# Patient Record
Sex: FEMALE | Race: WHITE
[De-identification: names, ages, dates, MRNs, and addresses within clinical notes are randomized per-mention and may not be internally consistent; named-entity substitution may affect disease eponyms.]

---

## 2018-07-30 ENCOUNTER — HOSPITAL ENCOUNTER (EMERGENCY)
Dept: HOSPITAL 68 - ERH | Age: 28
End: 2018-07-30
Payer: COMMERCIAL

## 2018-07-30 VITALS — BODY MASS INDEX: 25.68 KG/M2 | WEIGHT: 136 LBS | HEIGHT: 61 IN

## 2018-07-30 VITALS — DIASTOLIC BLOOD PRESSURE: 72 MMHG | SYSTOLIC BLOOD PRESSURE: 126 MMHG

## 2018-07-30 DIAGNOSIS — S61.032A: Primary | ICD-10-CM
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
## 2018-07-30 LAB
ABSOLUTE GRANULOCYTE CT: 5.2 /CUMM (ref 1.4–6.5)
BASOPHILS # BLD: 0 /CUMM (ref 0–0.2)
BASOPHILS NFR BLD: 0.1 % (ref 0–2)
EOSINOPHIL # BLD: 0 /CUMM (ref 0–0.7)
EOSINOPHIL NFR BLD: 0.7 % (ref 0–5)
ERYTHROCYTE [DISTWIDTH] IN BLOOD BY AUTOMATED COUNT: 16.1 % (ref 11.5–14.5)
GRANULOCYTES NFR BLD: 73.3 % (ref 42.2–75.2)
HCT VFR BLD CALC: 28.1 % (ref 37–47)
LYMPHOCYTES # BLD: 1.5 /CUMM (ref 1.2–3.4)
MCH RBC QN AUTO: 21 PG (ref 27–31)
MCHC RBC AUTO-ENTMCNC: 32.2 G/DL (ref 33–37)
MCV RBC AUTO: 65.2 FL (ref 81–99)
MONOCYTES # BLD: 0.4 /CUMM (ref 0.1–0.6)
PLATELET # BLD: 257 /CUMM (ref 130–400)
PMV BLD AUTO: 9.1 FL (ref 7.4–10.4)
RED BLOOD CELL CT: 4.31 /CUMM (ref 4.2–5.4)
WBC # BLD AUTO: 7.2 /CUMM (ref 4.8–10.8)

## 2018-07-30 NOTE — ED GENERAL ADULT
History of Present Illness
 
General
Chief Complaint: General Adult
Stated Complaint: NEEDLESTICK
Source: patient
Exam Limitations: no limitations
 
Vital Signs & Intake/Output
Vital Signs & Intake/Output
 Vital Signs
 
 
Date Time Temp Pulse Resp B/P B/P Pulse O2 O2 Flow FiO2
 
     Mean Ox Delivery Rate 
 
 1310 98.8 80 18 126/72  100 Room Air  
 
 
 
Allergies
Coded Allergies:
NO KNOWN ALLERGIES (12)
 
Triage Note:
27 Y/O FEMALE PRESENTS S/P ACCIDENTAL NEEDLESTICK.
 PT STATES SHE WAS INJECTING IM MEDICATION TO PT,
 "SHE MOVED AND I STUCK MYSELF",  L THUMB AFFECTED.
 PT WASHED FINGER IMMEDIATELY.  CHARGE RN INFORMED.
 HOUSE SUPERVISOR BEING PAGED.  SOURCE PT REMAINS
 IN ED
Triage Nurses Notes Reviewed? yes
Onset: Abrupt
Duration: minute(s):
Timing: single episode today
Injury Environment: work
Pregnant: Yes
Patient currently breastfeeds: No
HPI:
28-year-old female comes into the emergency room for further evaluation after 
you stick injury.  Patient accidentally stuck herself after administering 
intramuscular injection to her left thumb.  She had some blood.  She washed it 
immediately with saline and peroxide.  Source patient known.  She signed in for 
further evaluation.  Denies any numbness or tingling or injury anywhere else.  
She is a  possibly 5 months pregnant
(Chito Collazo)
 
Past History
 
Travel History
Traveled to [place] past 21 day No
 
Medical History
Any Pertinent Medical History? see below for history
Neurological: NONE
EENT: NONE
Cardiovascular: NONE
Respiratory: NONE
Gastrointestinal: NONE
Hepatic: NONE
Renal: NONE
Musculoskeletal: NONE
Psychiatric: NONE
Endocrine: NONE
Blood Disorders: NONE
Cancer(s): NONE
GYN/Reproductive: NONE
 
Surgical History
Surgical History: none
 
Psychosocial History
What is your primary language English
Tobacco Use: Never used
 
Family History
Hx Contributory? No
(Chito Collazo)
 
Review of Systems
 
Review of Systems
Constitutional:
Reports: no symptoms. 
EENTM:
Reports: no symptoms. 
Respiratory:
Reports: no symptoms. 
Cardiovascular:
Reports: no symptoms. 
GI:
Reports: no symptoms. 
Genitourinary:
Reports: no symptoms. 
Musculoskeletal:
Reports: see HPI. 
Skin:
Reports: no symptoms. 
Neurological/Psychological:
Reports: no symptoms. 
Hematologic/Endocrine:
Reports: no symptoms. 
Immunologic/Allergic:
Reports: no symptoms. 
All Other Systems: Reviewed and Negative
(Chito Collazo)
 
Physical Exam
 
Physical Exam
General Appearance: well developed/nourished, alert, awake
Head: atraumatic
Eyes:
Bilateral: normal appearance. 
Ears, Nose, Throat: normal ENT inspection, hearing grossly normal
Neck: normal inspection
Respiratory: no respiratory distress
Back: normal inspection
Extremities: small puncture to left thumb,
Neurologic/Psych: awake, alert
 
Core Measures
ACS in differential dx? No
CVA/TIA Diagnosis: No
Sepsis Present: No
Sepsis Focused Exam Completed? No
(Chito Collazo)
 
Progress
Differential Diagnoses
I considered the following diagnoses in my evaluation of the patient:  HIV, 
hepatitis, cellulitis, foreign body,
 
Plan of Care:
 Orders
 
 
Procedure Date/time Status
 
HIV EXPOSURE/NEEDLESTICK 1313 Complete
 
HEPT C ANTIBODY 1313 Complete
 
HEPT B SURFACE ANTIBODY 1313 Complete
 
GAMMA GLUTAMYL TRANSFERASE 1313 Complete
 
COMPREHENSIVE METABOLIC PANEL 1313 Complete
 
CBC WITHOUT DIFFERENTIAL 1313 Complete
 
TRNSFRASE ASPART AMINO 1313 Complete
 
TRNSFRAS ALANINE AMINO  1313 Complete
 
 
 Laboratory Tests
 
 
18 1325:
Anion Gap 11, Estimated GFR > 60, BUN/Creatinine Ratio 22.0, Glucose 69, Calcium
9.1, Total Bilirubin 0.8, GGT 10  L, AST 31, ALT 28, Alkaline Phosphatase 45, 
Total Protein 6.9, Albumin 3.9, Globulin 3.0, Albumin/Globulin Ratio 1.3, CBC w 
Diff MAN DIFF ORDERED, RBC 4.31, MCV 65.2  L, MCH 21.0  L, MCHC 32.2  L, RDW 
16.1  H, MPV 9.1, Gran % 73.3, Lymphocytes % 20.5, Monocytes % 5.4, Eosinophils 
% 0.7, Basophils % 0.1, Absolute Granulocytes 5.2, Segmented Neutrophils 74, 
Band Neutrophils 2, Absolute Lymphocytes 1.5, Lymphocytes 17  L, Monocytes 6, 
Absolute Monocytes 0.4, Eosinophils 1, Absolute Eosinophils 0, Absolute 
Basophils 0, Platelet Estimate ADEQUATE, Hypochromic-Microcytic 1+, 
Poikilocytosis 1+, Basophilic Stippling 2+, Anisocytosis 1+, Elliptocytes FEW, 
Hep Bs Antibody REACTIVE, Hepatitis C Antibody NONREACTIVE, HIV 1&2 Antibody 
NONREACTIVE
 
Initial ED EKG: none
Comments:
2018 3:46:47 PM
 
Patient declined HIV prophylaxis.  Patient was instructed to follow-up with 
occupational medicine.  The results of the source patient were discussed with 
the patient.  The source patient tested negative for HIV hepatitis C and 
hepatitis B.  Patient will follow-up for 3 months and 6 months repeat labs for 
HIV and hepatitis C.
(Chito Collazo)
 
Departure
 
Departure
Disposition: HOME OR SELF CARE
Condition: Stable
Clinical Impression
Primary Impression: Needlestick injury accident
Referrals:
Carmine NAZARIO,Eric HUANG (PCP/Family)
 
Additional Instructions:
Follow-up with occupational medicine.  Return if any concerns worsening 
symptoms.
 
Please go over all results of today's visit with your primary care doctor.  
Contact your primary care doctor to let them know you were here in the emergency
room.  There may be nonspecific findings which may not be related to your visit 
today here in the emergency room but may require further evaluation and chronic 
monitoring by your primary care doctor.  If you had a laceration today the 
chance of foreign body always remains. You should follow-up with your primary 
care doctor for recheck in 3-5 days for a wound check.  If you had an x-ray done
there is a chance that a fracture could have been missed on initial read and you
should follow-up with your primary care doctor for repeat x-rays if symptoms 
persist.  If your blood pressure was elevated here in the emergency room please 
have rechecked by [facility] primary care doctor within the next 48.  If you were 
prescribed a narcotic here in the emergency room or any type of controlled 
substances you're not allowed to drive while taking this medication or operate 
any type of heavy machinery.  Narcotics can make you feel lightheaded dizziness 
nausea and can cause constipation.  You may need to  a stool softener.  
Thank you for choosing  emergency room.  Please return to the 
emergency room immediately if you have any other concerns worsening of symptoms.
 
Departure Forms:
Customer Survey
[employer] Employee Acc Report
General Discharge Information
(Chito Collazo)
 
PA/NP Co-Sign Statement
Statement:
ED Attending supervision documentation-
 
[] I saw and evaluated the patient. I have also reviewed all the pertinent lab 
results and diagnostic results. I agree with the findings and the plan of care 
as documented in the PA's/NP's documentation. 
 
[X] I have reviewed the ED Record and agree with the PA's/NP's documentation.
 
[] Additions or exceptions (if any) to the PAs/NP's note and plan are 
summarized below:
[]
 
(Bereket Parra DO)
 
Critical Care Note
 
Critical Care Note
Critical Care Time: non-applicable
(Franki CAMPOS,Chito)